# Patient Record
Sex: MALE | Race: BLACK OR AFRICAN AMERICAN | Employment: STUDENT | URBAN - METROPOLITAN AREA
[De-identification: names, ages, dates, MRNs, and addresses within clinical notes are randomized per-mention and may not be internally consistent; named-entity substitution may affect disease eponyms.]

---
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
## 2024-05-19 ENCOUNTER — HOSPITAL ENCOUNTER (EMERGENCY)
Age: 19
Discharge: HOME | End: 2024-05-19

## 2024-05-19 VITALS
DIASTOLIC BLOOD PRESSURE: 62 MMHG | OXYGEN SATURATION: 100 % | TEMPERATURE: 98.3 F | RESPIRATION RATE: 16 BRPM | HEART RATE: 56 BPM | SYSTOLIC BLOOD PRESSURE: 109 MMHG | HEIGHT: 68 IN | WEIGHT: 135 LBS | BODY MASS INDEX: 20.46 KG/M2

## 2024-05-19 DIAGNOSIS — R11.2 NAUSEA AND VOMITING, UNSPECIFIED VOMITING TYPE: Primary | ICD-10-CM

## 2024-05-19 LAB
ALBUMIN SERPL-MCNC: 4.1 G/DL (ref 3.5–5)
ALBUMIN/GLOB SERPL: 1.1 (ref 1–1.9)
ALP SERPL-CCNC: 70 U/L (ref 40–129)
ALT SERPL-CCNC: 23 U/L (ref 12–65)
ANION GAP SERPL CALC-SCNC: 11 MMOL/L (ref 9–18)
AST SERPL-CCNC: 22 U/L (ref 15–37)
BASOPHILS # BLD: 0 K/UL (ref 0–0.2)
BASOPHILS NFR BLD: 0 % (ref 0–2)
BILIRUB SERPL-MCNC: 0.3 MG/DL (ref 0–1.2)
BUN SERPL-MCNC: 12 MG/DL (ref 6–23)
CALCIUM SERPL-MCNC: 9.6 MG/DL (ref 8.8–10.2)
CHLORIDE SERPL-SCNC: 105 MMOL/L (ref 98–107)
CO2 SERPL-SCNC: 28 MMOL/L (ref 20–28)
CREAT SERPL-MCNC: 0.92 MG/DL (ref 0.8–1.3)
DIFFERENTIAL METHOD BLD: ABNORMAL
EOSINOPHIL # BLD: 0.1 K/UL (ref 0–0.8)
EOSINOPHIL NFR BLD: 1 % (ref 0.5–7.8)
ERYTHROCYTE [DISTWIDTH] IN BLOOD BY AUTOMATED COUNT: 13.2 % (ref 11.9–14.6)
GLOBULIN SER CALC-MCNC: 3.6 G/DL (ref 2.3–3.5)
GLUCOSE SERPL-MCNC: 102 MG/DL (ref 70–99)
HCT VFR BLD AUTO: 43.2 % (ref 41.1–50.3)
HGB BLD-MCNC: 14.8 G/DL (ref 13.6–17.2)
IMM GRANULOCYTES # BLD AUTO: 0 K/UL (ref 0–0.5)
IMM GRANULOCYTES NFR BLD AUTO: 0 % (ref 0–5)
LACTATE SERPL-SCNC: 1.2 MMOL/L (ref 0.5–2)
LYMPHOCYTES # BLD: 3.6 K/UL (ref 0.5–4.6)
LYMPHOCYTES NFR BLD: 31 % (ref 13–44)
MCH RBC QN AUTO: 30.1 PG (ref 26.1–32.9)
MCHC RBC AUTO-ENTMCNC: 34.3 G/DL (ref 31.4–35)
MCV RBC AUTO: 87.8 FL (ref 82–102)
MONOCYTES # BLD: 0.8 K/UL (ref 0.1–1.3)
MONOCYTES NFR BLD: 7 % (ref 4–12)
NEUTS SEG # BLD: 7 K/UL (ref 1.7–8.2)
NEUTS SEG NFR BLD: 61 % (ref 43–78)
NRBC # BLD: 0 K/UL (ref 0–0.2)
PLATELET # BLD AUTO: 212 K/UL (ref 150–450)
PMV BLD AUTO: 9.9 FL (ref 9.4–12.3)
POTASSIUM SERPL-SCNC: 3.7 MMOL/L (ref 3.5–5.1)
PROT SERPL-MCNC: 7.8 G/DL (ref 6.3–8.2)
RBC # BLD AUTO: 4.92 M/UL (ref 4.23–5.6)
SODIUM SERPL-SCNC: 145 MMOL/L (ref 136–145)
STREP, MOLECULAR: NOT DETECTED
WBC # BLD AUTO: 11.6 K/UL (ref 4.3–11.1)

## 2024-05-19 PROCEDURE — 99284 EMERGENCY DEPT VISIT MOD MDM: CPT

## 2024-05-19 PROCEDURE — 85025 COMPLETE CBC W/AUTO DIFF WBC: CPT

## 2024-05-19 PROCEDURE — 96375 TX/PRO/DX INJ NEW DRUG ADDON: CPT

## 2024-05-19 PROCEDURE — 83605 ASSAY OF LACTIC ACID: CPT

## 2024-05-19 PROCEDURE — 2580000003 HC RX 258

## 2024-05-19 PROCEDURE — 6370000000 HC RX 637 (ALT 250 FOR IP)

## 2024-05-19 PROCEDURE — 80053 COMPREHEN METABOLIC PANEL: CPT

## 2024-05-19 PROCEDURE — 6360000002 HC RX W HCPCS

## 2024-05-19 PROCEDURE — 87651 STREP A DNA AMP PROBE: CPT

## 2024-05-19 PROCEDURE — 96374 THER/PROPH/DIAG INJ IV PUSH: CPT

## 2024-05-19 RX ORDER — 0.9 % SODIUM CHLORIDE 0.9 %
1000 INTRAVENOUS SOLUTION INTRAVENOUS ONCE
Status: COMPLETED | OUTPATIENT
Start: 2024-05-19 | End: 2024-05-19

## 2024-05-19 RX ORDER — LIDOCAINE HYDROCHLORIDE 20 MG/ML
15 SOLUTION OROPHARYNGEAL
Status: COMPLETED | OUTPATIENT
Start: 2024-05-19 | End: 2024-05-19

## 2024-05-19 RX ORDER — ONDANSETRON 4 MG/1
4 TABLET, FILM COATED ORAL 3 TIMES DAILY PRN
Qty: 15 TABLET | Refills: 0 | Status: SHIPPED | OUTPATIENT
Start: 2024-05-19

## 2024-05-19 RX ORDER — ONDANSETRON 2 MG/ML
4 INJECTION INTRAMUSCULAR; INTRAVENOUS ONCE
Status: COMPLETED | OUTPATIENT
Start: 2024-05-19 | End: 2024-05-19

## 2024-05-19 RX ORDER — KETOROLAC TROMETHAMINE 15 MG/ML
15 INJECTION, SOLUTION INTRAMUSCULAR; INTRAVENOUS ONCE
Status: COMPLETED | OUTPATIENT
Start: 2024-05-19 | End: 2024-05-19

## 2024-05-19 RX ADMIN — KETOROLAC TROMETHAMINE 15 MG: 15 INJECTION, SOLUTION INTRAMUSCULAR; INTRAVENOUS at 01:20

## 2024-05-19 RX ADMIN — ONDANSETRON 4 MG: 2 INJECTION INTRAMUSCULAR; INTRAVENOUS at 01:20

## 2024-05-19 RX ADMIN — LIDOCAINE HYDROCHLORIDE 15 ML: 20 SOLUTION ORAL at 01:25

## 2024-05-19 RX ADMIN — SODIUM CHLORIDE 1000 ML: 9 INJECTION, SOLUTION INTRAVENOUS at 01:24

## 2024-05-19 ASSESSMENT — PAIN SCALES - GENERAL
PAINLEVEL_OUTOF10: 3
PAINLEVEL_OUTOF10: 1

## 2024-05-19 ASSESSMENT — PAIN - FUNCTIONAL ASSESSMENT
PAIN_FUNCTIONAL_ASSESSMENT: 0-10
PAIN_FUNCTIONAL_ASSESSMENT: 0-10

## 2024-05-19 ASSESSMENT — PAIN DESCRIPTION - DESCRIPTORS: DESCRIPTORS: SORE

## 2024-05-19 ASSESSMENT — LIFESTYLE VARIABLES
HOW OFTEN DO YOU HAVE A DRINK CONTAINING ALCOHOL: PATIENT DECLINED
HOW MANY STANDARD DRINKS CONTAINING ALCOHOL DO YOU HAVE ON A TYPICAL DAY: PATIENT DECLINED

## 2024-05-19 ASSESSMENT — PAIN DESCRIPTION - LOCATION
LOCATION: THROAT
LOCATION: THROAT

## 2024-05-19 NOTE — ED TRIAGE NOTES
Pt reports he has been vomiting since yesterday and now feels like he is having his throat closing up and difficulty breathing. Pt reports only allergy to pollen. Pt in no acute distress at this time. Pt has redness and swollen tonsils noted.

## 2024-05-19 NOTE — DISCHARGE INSTRUCTIONS
Use Zofran as needed for nausea or vomiting.  Please return for any worsening symptoms or concerns.

## 2024-05-19 NOTE — ED PROVIDER NOTES
Emergency Department Provider Note       PCP: Paulina Perez APRN - NP   Age: 18 y.o.   Sex: male     DISPOSITION Decision To Discharge 05/19/2024 02:17:02 AM       ICD-10-CM    1. Nausea and vomiting, unspecified vomiting type  R11.2           Medical Decision Making     18-year-old male presents with complaint of sore throat and vomiting.  He is nontender abdomen.  Posterior oropharynx with erythema without exudate.  Lab work unremarkable.  Strep testing negative.  Patient had no episodes of emesis in the emergency department.  Discharged with prescription for Zofran.     1 or more acute illnesses that pose a threat to life or bodily function.   Over the counter drug management performed.  Prescription drug management performed.  Patient was discharged risks and benefits of hospitalization were considered.  Shared medical decision making was utilized in creating the patients health plan today.    I independently ordered and reviewed each unique test.  I reviewed external records: ED visit note from an outside group.  I reviewed external records: provider visit note from PCP.                   History     18-year-old male with no known medical history presents for sore throat, vomiting.  Reports that this started yesterday.  Has had multiple episodes of emesis today when he woke he states he had a sore throat.  Denies abdominal pain, fever, chills.    The history is provided by the patient.       ROS     Review of Systems   Constitutional:  Negative for chills, fatigue and fever.   HENT:  Positive for sore throat.    Respiratory:  Negative for chest tightness and shortness of breath.    Gastrointestinal:  Positive for nausea and vomiting. Negative for abdominal pain and diarrhea.   All other systems reviewed and are negative.       Physical Exam     Vitals signs and nursing note reviewed:  Vitals:    05/19/24 0051 05/19/24 0052 05/19/24 0054 05/19/24 0229   BP:  (!) 148/92  109/62   Pulse:  81  56   Resp:  20

## 2024-05-19 NOTE — ED NOTES
Patient mobility status  with no difficulty. Provider aware     I have reviewed discharge instructions with the patient.  The patient verbalized understanding.    Patient left ED via Discharge Method: ambulatory to Home with Parent.    Opportunity for questions and clarification provided.     Patient given 1 scripts.            Mady Zelaya LPN  05/19/24 0229

## 2025-03-11 ENCOUNTER — HOSPITAL ENCOUNTER (EMERGENCY)
Age: 20
Discharge: HOME | End: 2025-03-11

## 2025-03-11 VITALS
WEIGHT: 140 LBS | HEIGHT: 68 IN | TEMPERATURE: 97.9 F | RESPIRATION RATE: 16 BRPM | BODY MASS INDEX: 21.22 KG/M2 | OXYGEN SATURATION: 100 % | DIASTOLIC BLOOD PRESSURE: 85 MMHG | HEART RATE: 60 BPM | SYSTOLIC BLOOD PRESSURE: 120 MMHG

## 2025-03-11 DIAGNOSIS — Z01.89 ENCOUNTER FOR URINE TEST: Primary | ICD-10-CM

## 2025-03-11 PROCEDURE — 6370000000 HC RX 637 (ALT 250 FOR IP)

## 2025-03-11 PROCEDURE — 4500000002 HC ER NO CHARGE

## 2025-03-11 RX ORDER — ACETAMINOPHEN 325 MG/1
650 TABLET ORAL
Status: COMPLETED | OUTPATIENT
Start: 2025-03-11 | End: 2025-03-11

## 2025-03-11 RX ADMIN — ACETAMINOPHEN 650 MG: 325 TABLET ORAL at 01:05

## 2025-03-11 ASSESSMENT — PAIN SCALES - GENERAL
PAINLEVEL_OUTOF10: 8
PAINLEVEL_OUTOF10: 7

## 2025-03-11 ASSESSMENT — PAIN - FUNCTIONAL ASSESSMENT: PAIN_FUNCTIONAL_ASSESSMENT: 0-10

## 2025-03-11 ASSESSMENT — PAIN DESCRIPTION - LOCATION
LOCATION: HEAD
LOCATION: HEAD

## 2025-03-11 ASSESSMENT — PAIN DESCRIPTION - DESCRIPTORS
DESCRIPTORS: SHARP;THROBBING
DESCRIPTORS: SHARP

## 2025-03-11 ASSESSMENT — PAIN DESCRIPTION - FREQUENCY: FREQUENCY: CONTINUOUS

## 2025-03-11 ASSESSMENT — PAIN DESCRIPTION - PAIN TYPE: TYPE: ACUTE PAIN
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
## 2025-03-11 NOTE — ED TRIAGE NOTES
Pt arrives to triage accompanied by [place] [employer] [employer]. He is ambulatory to triage and is here for a urine sample